# Patient Record
(demographics unavailable — no encounter records)

---

## 2025-03-20 NOTE — HISTORY OF PRESENT ILLNESS
[FreeTextEntry1] : It's a pleasure to see Ms. NATACHA RODGERS In the office today. She is a 35 year -  old woman  who presents to the office today for the evaluation of  longstanding attention difficulties dating back to college, with recent worsening of symptoms. Childhood history reveals some attention difficulties in school, though never formally evaluated.   Currently, patient reports significant difficulty maintaining focus, completing tasks, and experiencing frequent distractions. She describes a recent incident where she left water running in the sink and found herself vacuuming in another room, forgetting about the running water. Patient works remotely in [place] (40 hours/week) and reports significant stress at work. She has two children (ages 7 and 3) and manages multiple responsibilities. Patient has a history of Graves' disease with slightly elevated thyroid function. She reports a sleep history significant for snoring and occasional episodes of waking up gasping/choking. Gets approximately 8 hours of sleep but may still feel tired.  Her [place] sleepiness score today is 10 Reports anxiety level of 4/10. No current medications for Graves' disease or other conditions.

## 2025-03-20 NOTE — ASSESSMENT
[FreeTextEntry1] : 1. Attention Deficit Disorder (ADD/ADHD)  - Ordered comprehensive workup including: * MRI brain to rule out structural pathology * EEG * CRIS (Test of Variables of Attention) - Referral to neuropsychologist for formal testing - Will consider stimulant medication pending test results  2. Possible Obstructive Sleep Apnea: - Ordered home sleep study given symptoms and concerning anatomy - Will review results and discuss treatment options based on severity  3. Graves' Disease: - Currently untreated but stable - Recommend follow-up with endocrinology for management  Follow-up planned in one month to review test results and discuss treatment plan. Patient advised that if choosing medication route, will require controlled substance agreement and regular in-person visits.